# Patient Record
Sex: FEMALE | Race: WHITE
[De-identification: names, ages, dates, MRNs, and addresses within clinical notes are randomized per-mention and may not be internally consistent; named-entity substitution may affect disease eponyms.]

---

## 2021-06-02 ENCOUNTER — HOSPITAL ENCOUNTER (OUTPATIENT)
Dept: HOSPITAL 76 - DI | Age: 26
Discharge: HOME | End: 2021-06-02
Attending: OBSTETRICS & GYNECOLOGY
Payer: COMMERCIAL

## 2021-06-02 DIAGNOSIS — Z32.01: ICD-10-CM

## 2021-06-02 DIAGNOSIS — Z36.89: Primary | ICD-10-CM

## 2021-06-02 LAB
BASOPHILS NFR BLD AUTO: 0.1 10^3/UL (ref 0–0.1)
BASOPHILS NFR BLD AUTO: 0.5 %
EOSINOPHIL # BLD AUTO: 0.2 10^3/UL (ref 0–0.7)
EOSINOPHIL NFR BLD AUTO: 2.2 %
ERYTHROCYTE [DISTWIDTH] IN BLOOD BY AUTOMATED COUNT: 12.6 % (ref 12–15)
HCT VFR BLD AUTO: 39.7 % (ref 37–47)
HGB UR QL STRIP: 12.9 G/DL (ref 12–16)
LYMPHOCYTES # SPEC AUTO: 1.7 10^3/UL (ref 1.5–3.5)
LYMPHOCYTES NFR BLD AUTO: 17.6 %
MCH RBC QN AUTO: 28.7 PG (ref 27–31)
MCHC RBC AUTO-ENTMCNC: 32.5 G/DL (ref 32–36)
MCV RBC AUTO: 88.4 FL (ref 81–99)
MONOCYTES # BLD AUTO: 0.5 10^3/UL (ref 0–1)
MONOCYTES NFR BLD AUTO: 5 %
NEUTROPHILS # BLD AUTO: 7.2 10^3/UL (ref 1.5–6.6)
NEUTROPHILS # SNV AUTO: 9.7 X10^3/UL (ref 4.8–10.8)
NEUTROPHILS NFR BLD AUTO: 74.5 %
NRBC # BLD AUTO: 0 /100WBC
NRBC # BLD AUTO: 0 X10^3/UL
PDW BLD AUTO: 9.8 FL (ref 7.9–10.8)
PLATELET # BLD: 289 10^3/UL (ref 130–450)
RBC MAR: 4.49 10^6/UL (ref 4.2–5.4)

## 2021-06-02 PROCEDURE — 85025 COMPLETE CBC W/AUTO DIFF WBC: CPT

## 2021-06-02 PROCEDURE — 86762 RUBELLA ANTIBODY: CPT

## 2021-06-02 PROCEDURE — 86850 RBC ANTIBODY SCREEN: CPT

## 2021-06-02 PROCEDURE — 87340 HEPATITIS B SURFACE AG IA: CPT

## 2021-06-02 PROCEDURE — 86901 BLOOD TYPING SEROLOGIC RH(D): CPT

## 2021-06-02 PROCEDURE — 36415 COLL VENOUS BLD VENIPUNCTURE: CPT

## 2021-06-02 PROCEDURE — 86900 BLOOD TYPING SEROLOGIC ABO: CPT

## 2021-06-02 PROCEDURE — 86803 HEPATITIS C AB TEST: CPT

## 2021-06-02 PROCEDURE — 87389 HIV-1 AG W/HIV-1&-2 AB AG IA: CPT

## 2021-06-02 PROCEDURE — 86787 VARICELLA-ZOSTER ANTIBODY: CPT

## 2021-06-02 PROCEDURE — 86592 SYPHILIS TEST NON-TREP QUAL: CPT

## 2021-06-03 ENCOUNTER — HOSPITAL ENCOUNTER (OUTPATIENT)
Dept: HOSPITAL 76 - LAB.WC | Age: 26
Discharge: HOME | End: 2021-06-03
Attending: OBSTETRICS & GYNECOLOGY
Payer: COMMERCIAL

## 2021-06-03 DIAGNOSIS — Z36.89: Primary | ICD-10-CM

## 2021-06-03 PROCEDURE — 80306 DRUG TEST PRSMV INSTRMNT: CPT

## 2021-06-03 PROCEDURE — 87591 N.GONORRHOEAE DNA AMP PROB: CPT

## 2021-06-03 PROCEDURE — 87661 TRICHOMONAS VAGINALIS AMPLIF: CPT

## 2021-06-03 PROCEDURE — 87491 CHLMYD TRACH DNA AMP PROBE: CPT

## 2021-06-03 NOTE — ULTRASOUND REPORT
PROCEDURE:  OB First Trimester w/TV

 

INDICATIONS:  POSTIVIE PREGNANCY TEST

 

OUTSIDE/PRIOR DATING DATA:  

Last menstrual period (LMP): 3/19/2021.  

LMP-based estimated date of delivery (MALCOLM): 12/24/2021.  

First dating scan (date and location): 6/2/2021.  

Estimated date of delivery (MALCOLM) from first dating scan: 1/11/2022.  

The below data below was generated using the ultrasound MALCOLM of 1/11/2022

 

TECHNIQUE:  

Real-time scanning was performed of the fetus and maternal pelvic organs, with image documentation.  
Endovaginal scanning was also performed to better visualize the fetus and maternal ovaries.  

 

COMPARISON:  None

 

FINDINGS:  

 

Embryo:  Single living intrauterine pregnancy identified. Fetal pole identified. Crown-rump length me
asures 1.7 cm corresponding to ultrasound estimated gestational age of 8 weeks 1 day. Fetal heart rat
e measured at 160 bpm. Until note made of a small 1.1 x 0.4 cm subchorionic/periimplantational bleed.


 

Measurement variability in dating:  +/- 4 weeks by LMP, +/- 7 days by mean sac diameter (use before 6
 weeks gestation if crown-rump length not able to be measured), +/- 5 days by crown-rump length (6-12
 weeks gestation).  

 

Maternal organs: Small right ovarian corpus luteal cyst. Left ovary is sonographically normal.

 

 

IMPRESSION:  

 

Single living intrauterine pregnancy with ultrasound estimated gestational age of 8 weeks 1 day corre
sponding to ultrasound MALCOLM of 1/11/2022.

 

Reviewed by: Taty Logan MD, PhD on 6/3/2021 10:49 AM PDT

Approved by: Taty Logan MD, PhD on 6/3/2021 10:49 AM PDT

 

 

Station ID:  [id number]

## 2021-06-04 LAB
AMPHET UR QL SCN: NEGATIVE
BARBITURATES UR QL SCN>300 NG/ML: NEGATIVE
BENZODIAZ UR QL SCN: NEGATIVE
C TRACH DNA SPEC NAA+PROBE-ACNC: NEGATIVE
COCAINE UR-SCNC: NEGATIVE UMOL/L
HEPATITIS B SURFACE ANTIGEN: (no result)
HEPATITIS C ANTIBODY: (no result)
HIV AG/AB 4TH GEN: (no result)
METHADONE UR QL SCN: NEGATIVE
METHAMPHET UR QL SCN: NEGATIVE
N GONORRHOEA DNA GENITAL QL NAA+PROBE: NEGATIVE
OPIATES UR QL SCN: NEGATIVE
SIGNAL TO CUT-OFF: 0 (ref ?–1)
T VAGINALIS RRNA GENITAL QL PROBE: NEGATIVE
THC UR QL SCN: NEGATIVE
VOLATILE DRUGS POS SERPL SCN: (no result)

## 2021-08-24 ENCOUNTER — HOSPITAL ENCOUNTER (OUTPATIENT)
Dept: HOSPITAL 76 - DI | Age: 26
Discharge: HOME | End: 2021-08-24
Attending: OBSTETRICS & GYNECOLOGY
Payer: COMMERCIAL

## 2021-08-24 DIAGNOSIS — Z3A.20: ICD-10-CM

## 2021-08-24 DIAGNOSIS — O09.92: Primary | ICD-10-CM

## 2021-08-25 NOTE — ULTRASOUND REPORT
PROCEDURE:  OB Detailed Fetal Eval

 

INDICATIONS:  SUPERV HIGH RISK PREG, FAS

 

OUTSIDE/PRIOR DATING DATA:  

Last menstrual period (LMP): 3/19/2021.  

LMP-based estimated date of delivery (MALCOLM): 12/24/2021.  

First dating scan (date and location): 6/2/2021.  

Estimated date of delivery (MALCOLM) from first dating scan: 1/11/2022.  

The below data below was generated using the ultrasound MALCOLM of 1/11/2022

 

TECHNIQUE: 

Real-time scanning was performed of the fetus, with image documentation and biometric measurements.  


Endovaginal scanning:  Not performed.

 

COMPARISON:  OB ultrasound 6/2/2021

 

FINDINGS:  

 

General:  A single living intrauterine gestation is present.  

Presentation:  Variable

Placenta:  Placental position is fundal, without previa.  

Amniotic fluid index:  14.6 cm,  normal for gestational age.  Largest pocket 4.1 cm.

Fetal heart rate:  150 beats per minute.  

Maternal cervical canal:  3.9 cm long; normal length is 2.5 cm or more. 

 

Fetal biometrics:  

Biparietal diameter:  5.2 cm, 21 weeks 5 days

Head circumference:  19.0 cm, 21 weeks 2 days

Abdominal circumference:  16.6 cm, 21 weeks 5 days

Femur length:  3.4 cm, 20 weeks 5 days

Estimated gestational age from initial scan: 20 weeks 0 days  

Composite gestational age from present scan:  20 weeks 6 days

Estimated fetal weight and percentile:  411 g, 97th percentile.

Measurement variability in biometric dating: +/- 10 days from 12-20 weeks gestation, +/- 2 weeks from
 20-30 weeks gestation, +/- 3 weeks at 30 weeks gestation or later.  

 

Anatomic survey:  

Neuro:  Ventricles are normal at less than 10 mm.  Cisterna magna is normal at 3-11 mm.  Cerebellum i
s normal in size and morphology.  

Nuchal skin fold:  Normal at less than 6 mm between 14 and 20 weeks gestational age.  

Face:  Nose and lips, facial profile are normal.  

Spine:  No evidence for spina bifida.  

Heart:  4-chambered heart is present, with normal ventricular outflow tracts.  

Diaphragm:  Diaphragm is intact.  

Stomach:  Left-sided stomach is present.  

Kidneys:  No fetal hydronephrosis.  Normal is less than 5 mm in 2nd trimester, less than 7 mm in 3rd 
trimester.  

Cord:  3 vessel cord has orthotopic insertion.  

Bladder:  Normal in size.  

Extremities:  All 4 extremities are visualized.  

 

IMPRESSION:  

1.  Single live intrauterine pregnancy.

2.  Estimated fetal weight 411 g, at the 97th percentile for gestational age. Recommend correlation a
nd follow-up to exclude macrosomia.

3.  Normal fetal anatomic survey.

4.  Normal amniotic fluid index.

 

Reviewed by: Donovan Whalen MD on 8/25/2021 12:03 PM PDT

Approved by: Donovan Whalen MD on 8/25/2021 12:03 PM PDT

 

 

Station ID:  [id number]

## 2021-09-02 ENCOUNTER — HOSPITAL ENCOUNTER (OUTPATIENT)
Dept: HOSPITAL 76 - LAB | Age: 26
Discharge: HOME | End: 2021-09-02
Attending: OBSTETRICS & GYNECOLOGY
Payer: COMMERCIAL

## 2021-09-02 DIAGNOSIS — O09.90: Primary | ICD-10-CM

## 2021-09-02 PROCEDURE — 36415 COLL VENOUS BLD VENIPUNCTURE: CPT

## 2021-09-02 PROCEDURE — 82950 GLUCOSE TEST: CPT

## 2021-11-08 ENCOUNTER — HOSPITAL ENCOUNTER (OUTPATIENT)
Dept: HOSPITAL 76 - LAB | Age: 26
Discharge: HOME | End: 2021-11-08
Attending: OBSTETRICS & GYNECOLOGY
Payer: COMMERCIAL

## 2021-11-08 DIAGNOSIS — O09.90: Primary | ICD-10-CM

## 2021-11-08 PROCEDURE — 36415 COLL VENOUS BLD VENIPUNCTURE: CPT

## 2021-11-08 PROCEDURE — 82950 GLUCOSE TEST: CPT

## 2021-12-01 ENCOUNTER — HOSPITAL ENCOUNTER (OUTPATIENT)
Dept: HOSPITAL 76 - WFO | Age: 26
Discharge: HOME | End: 2021-12-01
Attending: STUDENT IN AN ORGANIZED HEALTH CARE EDUCATION/TRAINING PROGRAM
Payer: COMMERCIAL

## 2021-12-01 ENCOUNTER — HOSPITAL ENCOUNTER (OUTPATIENT)
Dept: HOSPITAL 76 - LAB.WC | Age: 26
Discharge: HOME | End: 2021-12-01
Attending: OBSTETRICS & GYNECOLOGY
Payer: COMMERCIAL

## 2021-12-01 VITALS — DIASTOLIC BLOOD PRESSURE: 79 MMHG | SYSTOLIC BLOOD PRESSURE: 159 MMHG

## 2021-12-01 DIAGNOSIS — O13.3: Primary | ICD-10-CM

## 2021-12-01 DIAGNOSIS — Z3A.34: ICD-10-CM

## 2021-12-01 DIAGNOSIS — Z36.85: Primary | ICD-10-CM

## 2021-12-01 LAB
ALBUMIN DIAFP-MCNC: 2.7 G/DL (ref 3.2–5.5)
ALBUMIN/GLOB SERPL: 0.7 {RATIO} (ref 1–2.2)
ALP SERPL-CCNC: 140 IU/L (ref 42–121)
ALT SERPL W P-5'-P-CCNC: 16 IU/L (ref 10–60)
ANION GAP SERPL CALCULATED.4IONS-SCNC: 9 MMOL/L (ref 6–13)
AST SERPL W P-5'-P-CCNC: 15 IU/L (ref 10–42)
BASOPHILS NFR BLD AUTO: 0 10^3/UL (ref 0–0.1)
BASOPHILS NFR BLD AUTO: 0.4 %
BILIRUB BLD-MCNC: 0.4 MG/DL (ref 0.2–1)
BUN SERPL-MCNC: 7 MG/DL (ref 6–20)
CALCIUM UR-MCNC: 9.1 MG/DL (ref 8.5–10.3)
CHLORIDE SERPL-SCNC: 106 MMOL/L (ref 101–111)
CO2 SERPL-SCNC: 20 MMOL/L (ref 21–32)
CREAT SERPLBLD-SCNC: 0.5 MG/DL (ref 0.4–1)
CREAT UR-SCNC: 243.7 MG/DL
EOSINOPHIL # BLD AUTO: 0.3 10^3/UL (ref 0–0.7)
EOSINOPHIL NFR BLD AUTO: 2.9 %
ERYTHROCYTE [DISTWIDTH] IN BLOOD BY AUTOMATED COUNT: 13.7 % (ref 12–15)
GFRSERPLBLD MDRD-ARVRAT: 147 ML/MIN/{1.73_M2} (ref 89–?)
GLOBULIN SER-MCNC: 3.9 G/DL (ref 2.1–4.2)
GLUCOSE SERPL-MCNC: 82 MG/DL (ref 70–100)
HCT VFR BLD AUTO: 34.1 % (ref 37–47)
HGB UR QL STRIP: 11.2 G/DL (ref 12–16)
LYMPHOCYTES # SPEC AUTO: 1.4 10^3/UL (ref 1.5–3.5)
LYMPHOCYTES NFR BLD AUTO: 15.4 %
MCH RBC QN AUTO: 26.3 PG (ref 27–31)
MCHC RBC AUTO-ENTMCNC: 32.8 G/DL (ref 32–36)
MCV RBC AUTO: 80 FL (ref 81–99)
MONOCYTES # BLD AUTO: 0.9 10^3/UL (ref 0–1)
MONOCYTES NFR BLD AUTO: 10.1 %
NEUTROPHILS # BLD AUTO: 6.3 10^3/UL (ref 1.5–6.6)
NEUTROPHILS # SNV AUTO: 9.1 X10^3/UL (ref 4.8–10.8)
NEUTROPHILS NFR BLD AUTO: 70 %
NRBC # BLD AUTO: 0 /100WBC
NRBC # BLD AUTO: 0 X10^3/UL
PDW BLD AUTO: 10.5 FL (ref 7.9–10.8)
PLATELET # BLD: 271 10^3/UL (ref 130–450)
POTASSIUM SERPL-SCNC: 4.2 MMOL/L (ref 3.5–5)
PROT 24H UR-MCNC: 55 MG/DL
PROT SPEC-MCNC: 6.6 G/DL (ref 6.7–8.2)
RBC MAR: 4.26 10^6/UL (ref 4.2–5.4)
SODIUM SERPLBLD-SCNC: 135 MMOL/L (ref 135–145)

## 2021-12-01 PROCEDURE — 99215 OFFICE O/P EST HI 40 MIN: CPT

## 2021-12-01 PROCEDURE — 80053 COMPREHEN METABOLIC PANEL: CPT

## 2021-12-01 PROCEDURE — 36415 COLL VENOUS BLD VENIPUNCTURE: CPT

## 2021-12-01 PROCEDURE — 85025 COMPLETE CBC W/AUTO DIFF WBC: CPT

## 2021-12-01 PROCEDURE — 59025 FETAL NON-STRESS TEST: CPT

## 2021-12-01 PROCEDURE — 87797 DETECT AGENT NOS DNA DIR: CPT

## 2021-12-01 PROCEDURE — 82570 ASSAY OF URINE CREATININE: CPT

## 2021-12-01 PROCEDURE — 84156 ASSAY OF PROTEIN URINE: CPT

## 2021-12-01 NOTE — PROVIDER PROGRESS NOTE
- HPI


Chief Complaint: Hypertension/PIH


Current Pregnancy: 


Vital Signs











Blood Pressure  154/69 H  21 14:10




















Temperature  98.6 F   21 14:18


 


Heart Rate      


 


Respiratory Rate      


 


Blood Pressure  159/79 H  21 15:10


 


O2 Saturation      














- Procedures


OB Procedure Performed: NST


Diagnosis/Indication for NST: Gestational Hypertension


NST Procedure: 


 





FHT: 130 beats per baseline, moderate variability, accelerations present, no 

decelerations.


[place]: Irregular, not strong enough to bother patient.


Service Date of procedure: 21 (Read 21)


Procedure Details: 





NST Procedure





Start Date                       21


Start Time                       14:10


Stop Time                        15:55


Vibroacoustic Stimulation Used   No


Patient States Fetal Movement    Yes








Findings: 


Patient is a 26-year-old G1, P0 at 34 weeks 1 day gestation-year-old presenting 

to triage for gestational hypertension.  She was sent from clinic today for 

elevated blood pressures.


She has good fetal movement, no leaking, no vaginal bleeding.  She denies 

headache, right upper quadrant pain, changes in vision.





Past medical history


Anxiety


Obesity





Past surgical history 


Wrist surgery





Family history


Mother: Obesity


Father: Obesity, diabetes, hypertension


Maternal grandfather: Lung cancer





Social history


Denies tobacco, alcohol, drugs





Physical 


                                        











Temp Pulse Resp BP Pulse Ox


 


 98.6 F         159/79 H   


 


 21 14:18        21 15:10   











Constitutional: alert, no acute distress, well hydrated, well developed, well no

urished, appropriate dress.


Skin: normal turgor, normal color.


Head: atraumatic, normocephalic.


Cardiovascular: RRR.


Respiratory: no respiratory distress.


Abdomen: Gravid nondistended, nontender.


Neurologic: normal, sensation intact, motor intact.


Psych: affect and mood appropriate, normal interaction, good eye contact.





Assessment and plan


26-year-old  at 34 weeks 1 day gestation with gestational hypertension





1.  Gestational hypertension:


-No signs or symptoms of preeclampsia


-Platelets, AST/ALT, protein creatinine ratio were all normal.


-Patient did have elevated blood pressure, but these were done with a cuff to 

small along her forearm.  Repeated with an adequate cuff sized on her upper arm 

and were elevated, but not severe range.


-Patient has follow-up in clinic for blood pressure check as well as subsequent 

prenatal check.


-Preeclampsia warning signs and labor precautions given.


-Discussed induction of labor at 37 weeks or sooner if symptoms worsen.

## 2021-12-10 ENCOUNTER — HOSPITAL ENCOUNTER (OUTPATIENT)
Dept: HOSPITAL 76 - DI | Age: 26
Discharge: HOME | End: 2021-12-10
Attending: OBSTETRICS & GYNECOLOGY
Payer: COMMERCIAL

## 2021-12-10 DIAGNOSIS — O09.93: Primary | ICD-10-CM

## 2021-12-10 DIAGNOSIS — O36.63X1: ICD-10-CM

## 2021-12-10 DIAGNOSIS — Z3A.37: ICD-10-CM

## 2021-12-13 NOTE — ULTRASOUND REPORT
PROCEDURE:  OB F/U or Repeat

 

INDICATIONS:  SUPERV OF HIGH RISK PREG, EXCESSIVE FETAL GROWTH

 

OUTSIDE/PRIOR DATING DATA:  

Last menstrual period (LMP): 3/19/2021.  

LMP-based estimated date of delivery (MALCOLM): 12/24/2021.  

First dating scan (date and location): 6/2/2021.  

Estimated date of delivery (MALCOLM) from first dating scan: 1/11/2022.  

The below data below was generated using the ultrasound derived MALCOLM of 1/11/2022

 

TECHNIQUE: 

Real-time scanning was performed of the fetus, with image documentation and biometric measurements.  


 

COMPARISON:  8/24/2021, 6/2/2021.

 

FINDINGS:  

 

General:  A single living intrauterine gestation is present.  

Presentation:  Vertex

Placenta:  Placental position is fundal, without previa.  

Amniotic fluid index:  11.1 cm, within normal limits for gestational age. Largest pocket measures 4.6
 cm. 

Fetal heart rate:  121 beats per minute.  

Maternal cervical canal:  Not well seen. 

 

Fetal biometrics:  

Biparietal diameter:  9.2 cm, 37 weeks 3 days

Head circumference:  33.7 cm, 38 weeks 5 days

Abdominal circumference:  35.2 cm, 39 weeks 1 day

Femur length:  7.0 cm, 35 weeks 6 days

Estimated gestational age from initial scan: 35 weeks 3 days

Composite gestational age from present scan:  37 weeks 6 days

Estimated fetal weight and percentile: 3408 g corresponding to the 98th percentile

Measurement variability in biometric dating: +/- 10 days from 12-20 weeks gestation, +/- 2 weeks from
 20-30 weeks gestation, +/- 3 weeks at 30 weeks gestation or more.  

 

IMPRESSION:  

 

1. Single living intrauterine pregnancy demonstrating interval growth with estimated fetal weight at 
the 98th percentile, again suggestive of developing macrosomia.

 

2. Maternal cervical canal not well seen.

 

3. Amniotic fluid index within normal limits.

 

Reviewed by: Triston Cueto MD on 12/13/2021 10:47 AM PST

Approved by: Triston Cueto MD on 12/13/2021 10:47 AM PST

 

 

Station ID:  [id number]

## 2021-12-15 ENCOUNTER — HOSPITAL ENCOUNTER (OUTPATIENT)
Dept: HOSPITAL 76 - WFO | Age: 26
Discharge: HOME | End: 2021-12-15
Attending: OBSTETRICS & GYNECOLOGY
Payer: COMMERCIAL

## 2021-12-15 VITALS — SYSTOLIC BLOOD PRESSURE: 120 MMHG | DIASTOLIC BLOOD PRESSURE: 73 MMHG

## 2021-12-15 DIAGNOSIS — O13.3: Primary | ICD-10-CM

## 2021-12-15 DIAGNOSIS — Z3A.36: ICD-10-CM

## 2021-12-15 PROCEDURE — 59025 FETAL NON-STRESS TEST: CPT

## 2021-12-15 NOTE — ULTRASOUND REPORT
PROCEDURE:  OB Limited

 

INDICATIONS:  gest. HTN

 

OUTSIDE/PRIOR DATING DATA:  

Last menstrual period (LMP): 3/19/2021.  

LMP-based estimated date of delivery (MALCOLM): 12/24/2021.  

First dating scan (date and location): 6/2/2021.  

Estimated date of delivery (MALCOLM) from first dating scan: 1/11/2022.  

The below data below was generated using the ultrasound MALCOLM of 1/11/2022

 

TECHNIQUE: 

Real-time scanning was performed of the fetus, with image documentation.  

Endovaginal scanning:  Not performed.

 

COMPARISON:  OB ultrasound 12/10/2021.

 

FINDINGS:  

A single living intrauterine gestation is present.  

Presentation: Vertex

Placenta: Placental position is anterior fundal, without previa.  

Amniotic fluid index: 23.7 cm, normal for gestational age.  Largest pocket 7 cm.

Fetal heart rate: 140 beats per minutes.  

Maternal cervical canal: Not well seen. 

 

Estimated gestational age from initial scan: 36 weeks 1 day.  

 

IMPRESSION:  

1. Walker living intrauterine pregnancy at 36 weeks 1 day based on prior ultrasound. Vertex positi
on.

 

2. Normal placenta. GOLD 23.7 cm is within normal limits.

 

Reviewed by: Julio Hernandez MD on 12/15/2021 12:50 PM PST

Approved by: Julio Hernandez MD on 12/15/2021 12:50 PM PST

 

 

Station ID:  [id number]

## 2021-12-15 NOTE — PROCEDURE REPORT
- HPI


Diagnosis/Indication for NST: Gestational Hypertension


                                                               Current Pregnancy





EDU                              22


Gestation                        36 Weeks and 1 Days


                          1


Para                             0





Vital Signs











Temperature  98.6 F   12/15/21 10:41


 


Heart Rate  98   12/15/21 10:41


 


Respiratory Rate  18   12/15/21 10:41


 


Blood Pressure  120/73   12/15/21 10:41




















Temperature  98.6 F   12/15/21 10:41


 


Heart Rate  98   12/15/21 10:41


 


Respiratory Rate  18   12/15/21 10:41


 


Blood Pressure  120/73   12/15/21 10:41


 


O2 Saturation      














- NST Procedure


NST Procedure





Start Date                       12/15/21


Start Time                       10:45


Stop Time                        11:30


Vibroacoustic Stimulation Used   No


Patient States Fetal Movement    Yes











- Results and Plan


Findings/Impression: 


Fetal heart rate baseline-140beats per minutes


Moderate variability


Accelerations 15x15


Decelerations none


Contractions rare


NST reactive and reassuring





GOLD 23 ultrasound.,  High normal





Plan: 


Continue twice-weekly NST/ BPP

## 2021-12-20 ENCOUNTER — HOSPITAL ENCOUNTER (OUTPATIENT)
Dept: HOSPITAL 76 - WFO | Age: 26
Discharge: HOME | End: 2021-12-20
Attending: OBSTETRICS & GYNECOLOGY
Payer: COMMERCIAL

## 2021-12-20 ENCOUNTER — HOSPITAL ENCOUNTER (INPATIENT)
Dept: HOSPITAL 76 - WFO | Age: 26
LOS: 4 days | Discharge: HOME | End: 2021-12-24
Attending: STUDENT IN AN ORGANIZED HEALTH CARE EDUCATION/TRAINING PROGRAM | Admitting: OBSTETRICS & GYNECOLOGY
Payer: COMMERCIAL

## 2021-12-20 VITALS — SYSTOLIC BLOOD PRESSURE: 136 MMHG | DIASTOLIC BLOOD PRESSURE: 81 MMHG

## 2021-12-20 DIAGNOSIS — L29.9: ICD-10-CM

## 2021-12-20 DIAGNOSIS — F41.9: ICD-10-CM

## 2021-12-20 DIAGNOSIS — O13.3: Primary | ICD-10-CM

## 2021-12-20 DIAGNOSIS — D62: ICD-10-CM

## 2021-12-20 DIAGNOSIS — Z3A.36: ICD-10-CM

## 2021-12-20 DIAGNOSIS — Z3A.37: ICD-10-CM

## 2021-12-20 DIAGNOSIS — O36.63X0: ICD-10-CM

## 2021-12-20 DIAGNOSIS — O26.893: ICD-10-CM

## 2021-12-20 LAB
BASOPHILS NFR BLD AUTO: 0.1 10^3/UL (ref 0–0.1)
BASOPHILS NFR BLD AUTO: 0.5 %
EOSINOPHIL # BLD AUTO: 0.3 10^3/UL (ref 0–0.7)
EOSINOPHIL NFR BLD AUTO: 2.8 %
ERYTHROCYTE [DISTWIDTH] IN BLOOD BY AUTOMATED COUNT: 14.2 % (ref 12–15)
HCT VFR BLD AUTO: 31.8 % (ref 37–47)
HGB UR QL STRIP: 10.3 G/DL (ref 12–16)
LYMPHOCYTES # SPEC AUTO: 1.5 10^3/UL (ref 1.5–3.5)
LYMPHOCYTES NFR BLD AUTO: 16.6 %
MCH RBC QN AUTO: 25.7 PG (ref 27–31)
MCHC RBC AUTO-ENTMCNC: 32.4 G/DL (ref 32–36)
MCV RBC AUTO: 79.3 FL (ref 81–99)
MONOCYTES # BLD AUTO: 0.9 10^3/UL (ref 0–1)
MONOCYTES NFR BLD AUTO: 9.5 %
NEUTROPHILS # BLD AUTO: 6.4 10^3/UL (ref 1.5–6.6)
NEUTROPHILS # SNV AUTO: 9.2 X10^3/UL (ref 4.8–10.8)
NEUTROPHILS NFR BLD AUTO: 69.9 %
NRBC # BLD AUTO: 0 /100WBC
NRBC # BLD AUTO: 0 X10^3/UL
PDW BLD AUTO: 11.5 FL (ref 7.9–10.8)
PLATELET # BLD: 267 10^3/UL (ref 130–450)
RBC MAR: 4.01 10^6/UL (ref 4.2–5.4)

## 2021-12-20 PROCEDURE — 99215 OFFICE O/P EST HI 40 MIN: CPT

## 2021-12-20 PROCEDURE — 86850 RBC ANTIBODY SCREEN: CPT

## 2021-12-20 PROCEDURE — 59025 FETAL NON-STRESS TEST: CPT

## 2021-12-20 PROCEDURE — 76815 OB US LIMITED FETUS(S): CPT

## 2021-12-20 PROCEDURE — 86920 COMPATIBILITY TEST SPIN: CPT

## 2021-12-20 PROCEDURE — 36415 COLL VENOUS BLD VENIPUNCTURE: CPT

## 2021-12-20 PROCEDURE — 85025 COMPLETE CBC W/AUTO DIFF WBC: CPT

## 2021-12-20 PROCEDURE — 86900 BLOOD TYPING SEROLOGIC ABO: CPT

## 2021-12-20 PROCEDURE — 80053 COMPREHEN METABOLIC PANEL: CPT

## 2021-12-20 PROCEDURE — 86901 BLOOD TYPING SEROLOGIC RH(D): CPT

## 2021-12-20 RX ADMIN — SODIUM CHLORIDE, PRESERVATIVE FREE SCH ML: 5 INJECTION INTRAVENOUS at 22:40

## 2021-12-20 NOTE — PROCEDURE REPORT
- HPI


Diagnosis/Indication for NST: Gestational Hypertension


                                                               Current Pregnancy





EDU                              22


Gestation                        36 Weeks and 6 Days


                          1


Para                             0





Vital Signs











Temperature  97.8 F   21 13:13


 


Heart Rate  99   21 13:13


 


Respiratory Rate  18   21 13:13


 


Blood Pressure  136/81 H  21 13:13




















Temperature  97.9 F   21 13:16


 


Heart Rate  99   21 13:16


 


Respiratory Rate  18   21 13:16


 


Blood Pressure  136/81 H  21 13:16


 


O2 Saturation  100   21 13:16














- NST Procedure


NST Procedure





Start Date                       21


Start Time                       13:12


Stop Time                        13:33


Vibroacoustic Stimulation Used   No


Patient States Fetal Movement    Yes








 mod ozzy 15x15 accels no decels


TOCO: quiet








- Results and Plan


Plan: 





Patient is a 25 yo  at 36+6 with pregnancy affected by GHTN and suspected 

fetal macrosomia





Cat I tracing





GOLD 20





Plan for IOL tomorrow at 37 weeks





DOS: 21


NST read: 21





DX: IUP at 36+6 wga


      Gestational HTN


      Suspected fetal macrosomia

## 2021-12-20 NOTE — ULTRASOUND REPORT
PROCEDURE:  OB Limited

 

INDICATIONS:  Gestational hypertension; ; EDC 2022

 

OUTSIDE/PRIOR DATING DATA:  

Last menstrual period (LMP): 3/19/2021.  

LMP-based estimated date of delivery (MALCOLM): 2021.  

First dating scan (date and location): 2021.  

Estimated date of delivery (MALCOLM) from first dating scan: 2022.  

The below data below was generated using the ultrasound MALCOLM of 2022

 

TECHNIQUE: 

Real-time scanning was performed of the fetus, with image documentation.  

Endovaginal scanning:  Not performed

 

COMPARISON:  12/15/2021

 

FINDINGS:  

A single living intrauterine gestation is present.  

Presentation: Vertex

Placenta: Placental position is fundal, without previa.  

Amniotic fluid index: 20.6 cm, within normal limits for gestational age. (Previously 23.7 cm). Larges
t pocket 9.1 cm. 

Fetal heart rate: 145 beats per minutes.  

Maternal cervical canal: 3.3 cm long; normal length is 2.5 cm or more.  Not well seen.

 

Estimated gestational age from initial scan: 36 weeks 6 days.  

 

IMPRESSION:  

1. Walker living intrauterine pregnancy at 36 weeks 6 days based on prior ultrasound. Vertex posit
ion.

 

2. Normal placenta. GOLD within normal limits measuring 20.6 cm.

 

Reviewed by: Julio Hernandez MD on 2021 3:52 PM PST

Approved by: Julio Hernandez MD on 2021 3:52 PM PST

 

 

Station ID:  [id number]

## 2021-12-20 NOTE — HISTORY & PHYSICAL EXAMINATION
Prenatal Admit History





- Visit Reason


Visit Reason: Membranes rupture





- Pregnancy


: 1


Parity: 0


Prenatal Risk/History: positive: Pregnancy induced HTN, High risk





- Mother's Labs


Mother's Blood Type: positive: A


Mother's RH: positive: Positive


GBS: positive: Group B Step Negative


Rubella Status: positive: Immune





- Other Maternal History


Other Maternal History: 


ID: Patient is a 25 yo  at 36+6 wga here with ruptured membranes





HPI: Pregnancy has been complicated by GHTN with plan for IOL on 21. 


Patient reports large gush of fluid this evening as she was planning for a bath.

Continues to have high volume leakage. Not feeling contractions but has 

contraction on monitor. No VB. Endorses fetal movement. 


Mild range pressures during late pregnancy. No PIH symptoms and negative PIH 

labs. 


Has been having severe pruritus in late pregnancy. BA ordered but not completed.

Continues to have severe itching.


Measures size greater than dates. EFW 97%ile with  3408g estimated at 34-35 

weeks. 








PNC: 


LMP 3/19/21 gives MALCOLM of 2021


US on 21 at 8w1d gives MALCOLM 22


Final MALCOLM 22





BMI>40: 


-ASA 81 mg at 12 week


-early glucola- 121


- Suspected fetal macrosomia


-S>D; US at 35 weeks shows EFW 98%ile, 3408 g





Pruritus today: ordering BA  anf LFTs





A pos/Rub imm


GENETICS: Desires [place] with AFP- declined by ; declines other testing


-Also desires CF carrier screening- ordered. Discussed in FU. Did not complete. 


FAS:  EFW 97%ile, 3VC, fundal, CL 3.9 FAS wnl





TDAP 10/21


Influenza declined 


Glucola: 121- early glcuola


Repeat Glucola  results 133


HSV Denies


GBS: 2021 NEGATIVE


Breast pump: 10/20


PAP:  Patient prefers to wait until 6 week pp visit





 


Past Medical History:


   Reviewed and updated today:


      Anxiety Disorder


      BMI > 40





Past Surgical History:


   wrist ()





Family History Summary:


 Weight Disorder for Mother 


 Diabetes for Father


 Hypertension for Father 


 Weight Disorder for Father 


 Lung Cancer for Aunt


 Lung Cancer for Maternal Grandfather 








Social History Summary:


Patient has never smoked.


Patient has never used smokeless tobacco.


Passive Smoke: N


Alcohol Use: N


Drug Use: N


HIV/High Risk: N


Regular Exercise: Y


Hx Domestic Abuse: N


Rastafarian Affecting Care: N


Sexually Active: Y





Lives in [place] with  and [place] with DS


 at [employer]


FOB active duty


T: none


E: not in pregnancy


D: none


Safe at home





ROS: As per HPI, otherwise remaining systems are negative





PE: 


VS:151/73  82


GEN: NAD


HEENT: NCAT


CV: RRR


RESP: CTAB, nl effort


ABD: gravid, S&NT/ND


EXT: WWP


SVE: 50/-2


Gross rupture


vertex by BSUS





EFM: 135 mod ozzy 15x15 accels no decels


TOCO: Q5-6 min





A/P:   Patient is a 25 yo  at 36+6 wga here with ruptured membranes





LABOR: Discussed augmentation with pitocin vs expectant management. 


-Patient would like to have an hour to see if labor progresses and then is open 

to pitocin


-Reviewed risks of choriomanionitis. Increases with increased frequency of SVE, 

will limit cervical exams


-Open to augmentation with pitocin as indicated





GHTN: Isolated severe range pressure with subsequent mild rnage


-No PIH symptoms


-PIH  labs pending


-Labetalol vs nifed for severe range pressures; orders submitted


-Magnesium infusion with onset of severe features





QUESTION OF CHOLESTASIS: Patient with ongoign pruritus


-Did not complete BA assessment


-LFTs pending at admit





FWB: Vertex, suspected LGA, GBS negative, Cat I tracing


-CEFM


-Remain cognizant of EFW in the setting of dystotic labor course. VAVD only with

careful consideration of clinical scenario





PAIN: Desires epidural. OK with placement on patient request 





In-patient care








Meds/Allgy





- Allergies


Allergies/Adverse Reactions: 


                                    Allergies











Allergy/AdvReac Type Severity Reaction Status Date / Time


 


shellfish derived Allergy Unknown Unknown Verified 21 21:38

## 2021-12-21 LAB
ALBUMIN DIAFP-MCNC: 2.5 G/DL (ref 3.2–5.5)
ALBUMIN/GLOB SERPL: 0.6 {RATIO} (ref 1–2.2)
ALP SERPL-CCNC: 194 IU/L (ref 42–121)
ALT SERPL W P-5'-P-CCNC: 28 IU/L (ref 10–60)
ANION GAP SERPL CALCULATED.4IONS-SCNC: 10 MMOL/L (ref 6–13)
AST SERPL W P-5'-P-CCNC: 22 IU/L (ref 10–42)
BILIRUB BLD-MCNC: 0.3 MG/DL (ref 0.2–1)
BUN SERPL-MCNC: 12 MG/DL (ref 6–20)
CALCIUM UR-MCNC: 8.6 MG/DL (ref 8.5–10.3)
CHLORIDE SERPL-SCNC: 101 MMOL/L (ref 101–111)
CO2 SERPL-SCNC: 18 MMOL/L (ref 21–32)
CREAT SERPLBLD-SCNC: 0.5 MG/DL (ref 0.4–1)
GFRSERPLBLD MDRD-ARVRAT: 147 ML/MIN/{1.73_M2} (ref 89–?)
GLOBULIN SER-MCNC: 4 G/DL (ref 2.1–4.2)
GLUCOSE SERPL-MCNC: 145 MG/DL (ref 70–100)
POTASSIUM SERPL-SCNC: 3.9 MMOL/L (ref 3.5–5)
PROT SPEC-MCNC: 6.5 G/DL (ref 6.7–8.2)
SODIUM SERPLBLD-SCNC: 129 MMOL/L (ref 135–145)

## 2021-12-21 PROCEDURE — 10H07YZ INSERTION OF OTHER DEVICE INTO PRODUCTS OF CONCEPTION, VIA NATURAL OR ARTIFICIAL OPENING: ICD-10-PCS | Performed by: STUDENT IN AN ORGANIZED HEALTH CARE EDUCATION/TRAINING PROGRAM

## 2021-12-21 RX ADMIN — SODIUM CHLORIDE, PRESERVATIVE FREE SCH ML: 5 INJECTION INTRAVENOUS at 09:37

## 2021-12-21 RX ADMIN — ONDANSETRON PRN MG: 2 INJECTION INTRAMUSCULAR; INTRAVENOUS at 21:48

## 2021-12-21 RX ADMIN — LABETALOL HCL SCH MG: 100 TABLET, FILM COATED ORAL at 14:32

## 2021-12-21 RX ADMIN — CALCIUM CARBONATE (ANTACID) CHEW TAB 500 MG PRN MG: 500 CHEW TAB at 21:50

## 2021-12-21 RX ADMIN — CALCIUM CARBONATE (ANTACID) CHEW TAB 500 MG PRN MG: 500 CHEW TAB at 11:46

## 2021-12-21 RX ADMIN — LABETALOL HCL SCH MG: 100 TABLET, FILM COATED ORAL at 01:26

## 2021-12-21 RX ADMIN — CALCIUM CARBONATE (ANTACID) CHEW TAB 500 MG STA MG: 500 CHEW TAB at 04:26

## 2021-12-21 RX ADMIN — SODIUM CHLORIDE, POTASSIUM CHLORIDE, SODIUM LACTATE AND CALCIUM CHLORIDE SCH MLS/HR: 600; 310; 30; 20 INJECTION, SOLUTION INTRAVENOUS at 08:34

## 2021-12-21 RX ADMIN — SODIUM CHLORIDE, POTASSIUM CHLORIDE, SODIUM LACTATE AND CALCIUM CHLORIDE SCH MLS/HR: 600; 310; 30; 20 INJECTION, SOLUTION INTRAVENOUS at 00:38

## 2021-12-21 RX ADMIN — LABETALOL HCL SCH MG: 100 TABLET, FILM COATED ORAL at 01:25

## 2021-12-21 RX ADMIN — CALCIUM CARBONATE (ANTACID) CHEW TAB 500 MG STA MG: 500 CHEW TAB at 04:27

## 2021-12-21 RX ADMIN — ONDANSETRON PRN MG: 2 INJECTION INTRAMUSCULAR; INTRAVENOUS at 09:37

## 2021-12-21 NOTE — PROVIDER PROGRESS NOTE
Labor Progress Note





- Uterine Monitoring


Uterine Monitoring Mode: positive: External toco


Contraction Frequency (min/apart): 2-4


Contraction Intensity: positive: Mild to moderate


Uterine Resting Tone: positive: Soft





- Fetal Monitoring


Fetal Monitor Mode: positive: External ultrasound


Fetal Heart Rate Baseline: 130


Fetal Heart Rate Variability: positive: Moderate (6-25 bmp)


Fetal Accelerations: positive: Present, 15x15


Fetal Decelerations: positive: None


Fetal Strip Review: positive: Category I





- Vaginal Exam


Dilation (in cm): 7


Effacement (%): 90


Cervical Position: Posterior





- Labor Progress Note


Labor Progress Note/Additional Text: 


Patient making slow change, and there was some discrepancy over the last check. 

From my last exam, she is changed approximately 2 cm over 5 hours which is 

slower than expected.  She did have oxytocin discontinued for 30 minutes and was

recently restarted.  Will allow her to continue augmentation with oxytocin.  No 

signs of chorioamnionitis at this time.  Ruptured for 23 hours.  If she presents

is febrile or develops fetal tachycardia, will likely proceed with  

section if remote from delivery.

## 2021-12-21 NOTE — PROVIDER PROGRESS NOTE
Subjective





- Prog Note Date


Prog Note Date: 12/21/21


Prog Note Time: 08:28





- Subjective


Subjective: 





Patient has gotten an epidural this am. Had one dose of fentanyl and nitrous 

oxide this am. Comfortbale at present. BPs are in mild range on labetalol 100 mg

po bid. 


Pitocin currently at 8 mU/min. Fluid remains clear. 











Objective





- Vital Signs/Intake & Output


Reviewed Vital Signs: Yes


Vital Signs: 


see Centricity- mild range pressures


Intake & Output: 


                                 Intake & Output











 12/18/21 12/19/21 12/20/21 12/21/21





 23:59 23:59 23:59 23:59


 


Intake Total    800


 


Output Total   125 350


 


Balance   -125 450














- Objective


General Appearance: positive: No acute distress


Respiratory: positive: No respiratory distress


Cardiovascular: positive: Other (RR)


Abdomen: positive: Non-tender (gravid, S&NT)


Skin: positive: Color nml


Extremities: positive: Non-tender


Neurologic/Psychiatric: positive: Oriented x3


Comments/Other: 





SVE 5/70/-1, soft





- Lab Results


Fish Bones: 


                                 12/20/21 22:40





                                 12/20/21 22:40


Other Labs: 


                               Lab Results x24hrs











  12/20/21 12/20/21 12/20/21 Range/Units





  22:40 22:40 22:40 


 


WBC    9.2  (4.8-10.8)  x10^3/uL


 


RBC    4.01 L  (4.20-5.40)  10^6/uL


 


Hgb    10.3 L  (12.0-16.0)  g/dL


 


Hct    31.8 L  (37.0-47.0)  %


 


MCV    79.3 L  (81.0-99.0)  fL


 


MCH    25.7 L  (27.0-31.0)  pg


 


MCHC    32.4  (32.0-36.0)  g/dL


 


RDW    14.2  (12.0-15.0)  %


 


Plt Count    267  (130-450)  10^3/uL


 


MPV    11.5 H  (7.9-10.8)  fL


 


Neut # (Auto)    6.4  (1.5-6.6)  10^3/uL


 


Lymph # (Auto)    1.5  (1.5-3.5)  10^3/uL


 


Mono # (Auto)    0.9  (0.0-1.0)  10^3/uL


 


Eos # (Auto)    0.3  (0.0-0.7)  10^3/uL


 


Baso # (Auto)    0.1  (0.0-0.1)  10^3/uL


 


Absolute Nucleated RBC    0.00  x10^3/uL


 


Nucleated RBC %    0.0  /100WBC


 


Sodium   129 L   (135-145)  mmol/L


 


Potassium   3.9   (3.5-5.0)  mmol/L


 


Chloride   101   (101-111)  mmol/L


 


Carbon Dioxide   18 L   (21-32)  mmol/L


 


Anion Gap   10.0   (6-13)  


 


BUN   12   (6-20)  mg/dL


 


Creatinine   0.5   (0.4-1.0)  mg/dL


 


Estimated GFR (MDRD)   147   (>89)  


 


Glucose   145 H   ()  mg/dL


 


Calcium   8.6   (8.5-10.3)  mg/dL


 


Total Bilirubin   0.3   (0.2-1.0)  mg/dL


 


AST   22   (10-42)  IU/L


 


ALT   28   (10-60)  IU/L


 


Alkaline Phosphatase   194 H   ()  IU/L


 


Total Protein   6.5 L   (6.7-8.2)  g/dL


 


Albumin   2.5 L   (3.2-5.5)  g/dL


 


Globulin   4.0   (2.1-4.2)  g/dL


 


Albumin/Globulin Ratio   0.6 L   (1.0-2.2)  


 


Blood Type  A POSITIVE    


 


Antibody Screen  NEGATIVE    


 


Crossmatch IS Only  See Detail    














Assessment/Plan





- Problem List


(1) Labor abnormality


Impression: 


SROM: 


-on pitocin 8 mU/min


-Cat I tracing


-slow change





GHTN: mild pressures on labetalol 100 mg po bid


-Normal PIH  labs





Continue with pitocin augmentation

## 2021-12-21 NOTE — ANESTHESIA
Pre-Anesthesia VS, & Labs





- Diagnosis





active labor





- Procedure





labor epidural


Vital Signs: 





                                        











Temp Pulse Resp BP Pulse Ox


 


 37.4 C   87   20   151/73 H   


 


 21 22:20  21 22:20  21 22:20  21 22:20   











Height: 5 ft 7 in


Weight (kg): 127.913 kg


Body Mass Index: 44.1


BMI Classification: Morbidly Obese





- NPO


>8 hours





- Pregnancy


Is Patient Pregnant?: Yes





- Lab Results


Current Lab Results: 





Laboratory Tests





21 22:40: Blood Type A POSITIVE, Antibody Screen NEGATIVE, Crossmatch IS 

Only See Detail


21 22:40: Sodium 129 L, Potassium 3.9, Chloride 101, Carbon Dioxide 18 L, 

Anion Gap 10.0, BUN 12, Creatinine 0.5, Estimated GFR (MDRD) 147, Glucose 145 H,

Calcium 8.6, Total Bilirubin 0.3, AST 22, ALT 28, Alkaline Phosphatase 194 H, 

Total Protein 6.5 L, Albumin 2.5 L, Globulin 4.0, Albumin/Globulin Ratio 0.6 L


21 22:40: WBC 9.2, RBC 4.01 L, Hgb 10.3 L, Hct 31.8 L, MCV 79.3 L, MCH 

25.7 L, MCHC 32.4, RDW 14.2, Plt Count 267, MPV 11.5 H, Neut # (Auto) 6.4, Lymph

# (Auto) 1.5, Mono # (Auto) 0.9, Eos # (Auto) 0.3, Baso # (Auto) 0.1, Absolute 

Nucleated RBC 0.00, Nucleated RBC % 0.0








Lab results reviewed: Yes


Fish Bones: 


                                 21 22:40





                                 21 22:40





Home Medications and Allergies





Active Medications





Carboprost Tromethamine (Carboprost Tromethamine 250 Mcg/Ml Amp)  250 mcg IM 

ONCE PRN


   PRN Reason: Hemorrhage


   Stop: 21 21:23


Fentanyl (Fentanyl 100 Mcg/2 Ml Vial)  50 mcg IVP Q1H PRN


   PRN Reason: Severe Pain (score 7-10)


Hydralazine HCl (Hydralazine Inj 20 Mg/Ml Vial)  10 mg IVP ONCE PRN; Protocol


   PRN Reason: SBP> or= 160 OR DBP> or= 110


   Stop: 21 21:23


Hydralazine HCl (Hydralazine Inj 20 Mg/Ml Vial)  10 mg IVP .ONCE PRN; Protocol


   PRN Reason: Step 9 of Labetalol protocol


   Stop: 21 21:29


Oxytocin/Sodium Chloride (Pitocin/Sodium Chloride)  500 mls @ 999 mls/hr IV PRN 

PRN; Protocol


   PRN Reason: POST-PARTUM HEMORR PREVENTION


Tranexamic Acid (Tranexamic 1,000 Mg/100ml-Nacl)  1,000 mg in 100 mls @ 600 

mls/hr IV Q30M PRN


   PRN Reason: EBL >1200mL and within 3hr


Lactated Ringer's (Lr)  1,000 mls @ 125 mls/hr IV .Q8H KAT


   Last Admin: 21 00:38 Dose:  125 mls/hr


   Documented by: 


Oxytocin/Sodium Chloride (Pitocin/Sodium Chloride)  500 mls @ 1 mls/hr IV TITR 

KAT; Protocol


   Last Admin: 21 00:45 Dose:  1 milliunit/min, 1 mls/hr


   Documented by: 


Labetalol HCl (Labetalol 20 Mg/4 Ml Syringe)  20 mg IVP ONCE PRN; Protocol


   PRN Reason: SBP> or= 160 OR DBP> or= 110


   Stop: 21 21:23


Labetalol HCl (Labetalol 20 Mg/4 Ml Syringe)  20 - 80 mg IVP Q10M PRN; Protocol


   PRN Reason: SBP> or= 160 OR DBP> or= 110


Labetalol HCl (Labetalol 20 Mg/4 Ml Syringe)  20 mg IVP .ONCE PRN; Protocol


   PRN Reason: Step 9 of nifedipine protocol


   Stop: 21 21:29


Labetalol HCl (Labetalol 100 Mg Tablet)  100 mg PO BID KAT


   Last Admin: 21 01:26 Dose:  100 mg


   Documented by: 


Lidocaine HCl (Lidocaine-Mpf 1% 30 Ml Vial)  30 ml ID ONCE PRN


   PRN Reason: PERINEAL REPAIR


   Stop: 21 21:24


Methylergonovine Maleate (Methylergonovine 0.2 Mg/Ml Vial)  0.2 mg IM ONCE PRN


   PRN Reason: Hemorrhage


   Stop: 21 21:23


Nifedipine (Nifedipine 10 Mg Capsule)  10 - 20 mg PO Q20M PRN; Protocol


   PRN Reason: SBP> or= 160 OR DBP> or= 110


Oxytocin (Oxytocin 10 Unit/Ml Vial)  10 unit IM ONCE PRN


   PRN Reason: Step One if no IV access.


   Stop: 21 21:23


Sodium Chloride (Sodium Chloride Flush 0.9% 10 Ml Syringe)  10 ml IVP PRN PRN


   PRN Reason: AS NEEDED PER PROVIDER ORDERS


Sodium Chloride (Sodium Chloride Flush 0.9% 10 Ml Syringe)  10 ml IVP Q8H KAT


   Last Admin: 21 22:40 Dose:  10 ml


   Documented by: 


Terbutaline Sulfate (Terbutaline 1 Mg/Ml Vial)  0.25 mg SUBQ ONCE PRN


   PRN Reason: Tachystole


   Stop: 21 21:23








Allergies/Adverse Reactions: 


                                    Allergies











Allergy/AdvReac Type Severity Reaction Status Date / Time


 


shellfish derived Allergy Unknown Unknown Verified 21 21:38














Anes History & Medical History





- Anesthetic History


Anesthesia Complications: reports: No previous complications


Family history of Anesthesia Complications: Denies


Family history of Malignant Hyperthermia: Denies





- Medical History


Cardiovascular: reports: None


Pulmonary: reports: None


Gastrointestinal: reports: None


Urinary: reports: None


Neuro: reports: None


Musculoskeletal: reports: None


Endocrine/Autoimmune: reports: None


Blood Disorders: reports: None


Skin: reports: None


Smoking Status: Former smoker


Psychosocial: reports: No issues indicated


History of Cancer?: No





- Obstetrical History


: 1


Parity: 0


Prenatal Events: reports: Pregnancy induced HTN, High risk





Exam


General: Alert, Oriented x3, Cooperative, No acute distress


Dental: WNL





Plan


Anesthesia Type: Epidural


Consent for Procedure(s) Verified and Reviewed: Yes


Code Status: Attempt Resuscitation


ASA classification: 3-Severe systemic disease


Is this case an emergency?: No

## 2021-12-21 NOTE — PROVIDER PROGRESS NOTE
Labor Progress Note





- Uterine Monitoring


Uterine Monitoring Mode: positive: IUPC


Contraction Frequency (min/apart): 2-3


Contraction Intensity: positive: Mild to moderate


Uterine Resting Tone: positive: Soft





- Fetal Monitoring


Fetal Monitor Mode: positive: External ultrasound


Fetal Heart Rate Baseline: 130


Fetal Heart Rate Variability: positive: Moderate (6-25 bmp)


Fetal Accelerations: positive: Present, 15x15


Fetal Decelerations: positive: None


Fetal Strip Review: positive: Category I





- Vaginal Exam


Dilation (in cm): 5


Effacement (%): 90


Station: -2


Cervical Position: Midposition





- Labor Progress Note


Labor Progress Note/Additional Text: 





Patient making minimal change, although quite effaced. IUPC placed at this 

check. Oxytocin at 16 eddi units per minute. Will continue augmentation at this

time.

## 2021-12-22 PROCEDURE — 0HQ9XZZ REPAIR PERINEUM SKIN, EXTERNAL APPROACH: ICD-10-PCS | Performed by: STUDENT IN AN ORGANIZED HEALTH CARE EDUCATION/TRAINING PROGRAM

## 2021-12-22 RX ADMIN — IBUPROFEN SCH MG: 600 TABLET, FILM COATED ORAL at 14:07

## 2021-12-22 RX ADMIN — LABETALOL HCL SCH MG: 100 TABLET, FILM COATED ORAL at 21:37

## 2021-12-22 RX ADMIN — ACETAMINOPHEN SCH MG: 500 TABLET ORAL at 08:35

## 2021-12-22 RX ADMIN — CALCIUM CARBONATE (ANTACID) CHEW TAB 500 MG PRN MG: 500 CHEW TAB at 21:37

## 2021-12-22 RX ADMIN — ACETAMINOPHEN SCH MG: 500 TABLET ORAL at 17:06

## 2021-12-22 RX ADMIN — CALCIUM CARBONATE (ANTACID) CHEW TAB 500 MG PRN MG: 500 CHEW TAB at 03:29

## 2021-12-22 RX ADMIN — IBUPROFEN SCH MG: 600 TABLET, FILM COATED ORAL at 08:33

## 2021-12-22 RX ADMIN — LABETALOL HCL SCH MG: 100 TABLET, FILM COATED ORAL at 08:32

## 2021-12-22 RX ADMIN — ONDANSETRON PRN MG: 2 INJECTION INTRAMUSCULAR; INTRAVENOUS at 05:26

## 2021-12-22 NOTE — DELIVERY NOTE
Delivery Note





- Labor


Labor: positive: Augmented by oxytocin





- Infant Delivery Method


Infant Delivery Method: positive: Spontaneous vaginal delivery





- Birth Presentation


Birth Presentation: positive: JACEK - right occiput anterior





- Nuchal Cord


Nuchal Cord: positive: None





- Anesthetic


Anesthetic Type: 





- Episiotomy Type


Episiotomy Type: positive: None





- Laceration


Laceration: positive: 1st degree





- Suture


Suture Type: positive: Vicryl


Suture Size: positive: 3-0





- Delivery Outcome


Delivery Outcome: positive: Livebirth





- 


: positive: Placed in direct skin contact with mother, Bulb syringe, 

Warmed, [place] used, Warmer used


[place] sex: positive: Male





- Cord


Cord: positive: 3 vessels





- Placenta


Placenta: positive: Intact





- Estimated Blood Loss


Estimated Blood Loss (in cc): 700





- Post Delivery Events


Post Delivery Events: positive: No post delivery events





- Delivery Comments (Free Text/Narrative)


Delivery Comments (Free Text/Narrative): 





Preoperative Diagnoses


37 weeks gestation


Term labor/SROM


Gestational hypertension





Postoperative Diagnoses


Same


Prolonged rupture of membranes


Delivered


Shoulder dystocia


Large for gestational age





Delivery Summary:





Patient presented at 36 weeks 6 days gestation with spontaneous rupture of 

membranes at home.  She was admitted to labor and delivery and after 1 hour 

started on oxytocin.  She had a prolonged rupture of membranes.  She made slow 

progress until active labor.  After this, she progressed normally until complete

and began pushing.  She pushed for approximately 60 minutes when the delivery 

provider was called.  





Patient was placed in the dorsal lithotomy position. Upon maternal pushing the 

head was delivered atraumatically, but a shoulder dystocia was noted which 

resolved with gentle traction while performing Vivian maneuver and suprapubic

pressure.  This lasted for approximately 40 seconds.  The anterior shoulder then

delivered easily followed by the  posterior shoulder, then the remainder of the 

infant's body. A male infant was delivered with APGARS of 6 at 1 minute and 8 at

5 minutes.  After the cord finished pulsating, the umbilical cord was clamped 

times two and cut. The infant was taken to the warmer for further assessment. 





Thirty units of Pitocin were added to the IV fluid and allowed to run freely. 

The placenta delivered intact with three vessel cord, however this took slightly

longer than normal with approximately 40 minutes to deliver the placenta.. 

Placenta was sent to pathology. Uterine massage was performed until uterus was 

deemed firm.





Upon inspection of the perineum, a first-degree laceration was noted which was 

repaired with a stitch of 3-0 Vicryl.  Upon re-inspection the patient was 

hemostatic, however the patient had intermittent atony with large gushes of 

blood during the procedure.  She initially received a dose of 250 mcg of 

Hemabate IM. After the repair was completed, she was noted to still have 

intermittent gushes and received a dose of tranexamic acid. As bleeding 

conitnued, the patient received 800 mcg of misoprostol NV.  Uterus again 

massaged and found to be firm.  Patient was firm at that time, and needle and 

sponge counts were correct.





Patient was stable and allowed to recover in L&D room.  Infant was stable and 

remained in room with mother.





Fetal weight 3994 g. My assessment of the  showed symmetrical use of both

upper extremities. Discussed shoulder dystocia with patient and her partner.

## 2021-12-23 LAB
BASOPHILS NFR BLD AUTO: 0.1 10^3/UL (ref 0–0.1)
BASOPHILS NFR BLD AUTO: 0.5 %
EOSINOPHIL # BLD AUTO: 0.3 10^3/UL (ref 0–0.7)
EOSINOPHIL NFR BLD AUTO: 2.2 %
ERYTHROCYTE [DISTWIDTH] IN BLOOD BY AUTOMATED COUNT: 14.6 % (ref 12–15)
HCT VFR BLD AUTO: 24 % (ref 37–47)
HGB UR QL STRIP: 7.6 G/DL (ref 12–16)
LYMPHOCYTES # SPEC AUTO: 1.9 10^3/UL (ref 1.5–3.5)
LYMPHOCYTES NFR BLD AUTO: 14.7 %
MCH RBC QN AUTO: 25.3 PG (ref 27–31)
MCHC RBC AUTO-ENTMCNC: 31.7 G/DL (ref 32–36)
MCV RBC AUTO: 80 FL (ref 81–99)
MONOCYTES # BLD AUTO: 1.1 10^3/UL (ref 0–1)
MONOCYTES NFR BLD AUTO: 8.8 %
NEUTROPHILS # BLD AUTO: 9.4 10^3/UL (ref 1.5–6.6)
NEUTROPHILS # SNV AUTO: 12.9 X10^3/UL (ref 4.8–10.8)
NEUTROPHILS NFR BLD AUTO: 73.1 %
NRBC # BLD AUTO: 0 /100WBC
NRBC # BLD AUTO: 0 X10^3/UL
PDW BLD AUTO: 10.6 FL (ref 7.9–10.8)
PLATELET # BLD: 230 10^3/UL (ref 130–450)
RBC MAR: 3 10^6/UL (ref 4.2–5.4)

## 2021-12-23 RX ADMIN — ACETAMINOPHEN SCH MG: 500 TABLET ORAL at 18:19

## 2021-12-23 RX ADMIN — IBUPROFEN SCH MG: 600 TABLET, FILM COATED ORAL at 20:40

## 2021-12-23 RX ADMIN — IBUPROFEN SCH MG: 600 TABLET, FILM COATED ORAL at 01:18

## 2021-12-23 RX ADMIN — ACETAMINOPHEN SCH MG: 500 TABLET ORAL at 01:19

## 2021-12-23 RX ADMIN — ACETAMINOPHEN SCH MG: 500 TABLET ORAL at 09:43

## 2021-12-23 RX ADMIN — IBUPROFEN SCH MG: 600 TABLET, FILM COATED ORAL at 14:54

## 2021-12-23 RX ADMIN — IBUPROFEN SCH MG: 600 TABLET, FILM COATED ORAL at 08:16

## 2021-12-23 NOTE — DISCHARGE PLAN
Discharge Plan


Problem Reviewed?: Yes


Disposition: 01 Home, Self Care


Condition: Good


Prescriptions: 


Acetaminophen [Acetaminophen Extra Strength] 1,000 mg PO Q8H PRN #60 tablet


 PRN Reason: Pain


Docusate Sodium 100Mg Capsule [Colace 100Mg Capsule] 100 - 200 mg PO BID PRN #60

cap


 PRN Reason: Constipation


Ibuprofen [Motrin] 600 mg PO Q6H PRN #30 tab


 PRN Reason: Pain


Diet: Regular


Instruction Topics:  Birth Vaginal


No Smoking: If you smoke, Please STOP!  Call [phone] for help.


Follow-up with: 


Dinah Marino MD [Provider Admit Priv/Credential] -

## 2021-12-23 NOTE — DISCHARGE SUMMARY
Discharge Summary


Admit Date: 21


Discharge Date: 21


Discharging Provider: Kwame Hackett MD


Code Status: Attempt Resuscitation


Condition at Discharge: Good


Discharge Disposition: 01 Home, Self Care





- DIAGNOSES


Admission Diagnoses: 


Term labor/spontaneous rupture of membranes


37 weeks gestation








Discharge Diagnoses with Status of Each Condition: 


Term labor/SROM: Delivered


37 weeks gestation: Delivered


Prolonged rupture of membranes: Delivered


Acute blood loss anemia: Stable








- HPI


History of Present Illness: 


Subjective


Patient reports she is doing well.


Lochia appropriate. Denies heavy bleeding.


Ambulating.


Pelvic and abdominal pain well-controlled.


Tolerating oral intake. Diet: Regular.


Voiding without difficulty.


Passing flatus. Denies BM.


Patient is bonding with baby in room


Breast feeding going well.  


Denies feeling lightheaded, dizzy or excessively fatigued.





Objective





General:  Alert, oriented, no apparent distress.


Cardiovascular:   Regular rate.  Regular rhythm.


Lungs: No increased work of breathing.


Abdomen:  Uterus firm. Below umbilicus.  No guarding or rebound.








- HOSPITAL COURSE


Hospital Course: 





Patient was admitted at 37 weeks 6 days 36 weeks 6 days gestation for 

spontaneous rupture membrane, she was started on oxytocin for augmentation.  At 

37 weeks 0 days, she had a spontaneous vaginal delivery complicated by 42nd 

shoulder dystocia.  Her  was large for gestational age.  She did receive 

several medications for postpartum hemorrhage due to uterine atony, and had 

acute blood loss anemia postpartum.  She was vitally stable and asymptomatic, 

and she was discharged on postpartum day 1.





- ALLERGIES


Allergies/Adverse Reactions: 


                                    Allergies











Allergy/AdvReac Type Severity Reaction Status Date / Time


 


shellfish derived Allergy Unknown Unknown Verified 21 21:38














- MEDICATIONS


Home Medications: 


                                Ambulatory Orders











 Medication  Instructions  Recorded  Confirmed


 


Acetaminophen [Acetaminophen Extra 1,000 mg PO Q8H PRN #60 tablet 21 





Strength]   


 


Docusate Sodium 100Mg Capsule 100 - 200 mg PO BID PRN #60 cap 21 





[Colace 100Mg Capsule]   


 


Ibuprofen [Motrin] 600 mg PO Q6H PRN #30 tab 21 














- LABS


Result Diagrams: 


                                 21 06:25





                                 21 22:40





- FOLLOW UP


Follow Up: 


With Dinah Marino in 1 to 2 weeks








- TIME SPENT


Time Spent in Discharge (Minutes): 20

## 2021-12-24 VITALS — DIASTOLIC BLOOD PRESSURE: 76 MMHG | SYSTOLIC BLOOD PRESSURE: 137 MMHG

## 2021-12-24 RX ADMIN — IBUPROFEN SCH MG: 600 TABLET, FILM COATED ORAL at 08:09

## 2021-12-24 RX ADMIN — ACETAMINOPHEN SCH MG: 500 TABLET ORAL at 08:09

## 2021-12-24 NOTE — DISCHARGE SUMMARY
Discharge Summary


Admit Date: 21


Discharge Date: 21


Discharging Provider: Kwame Hackett MD


Code Status: Attempt Resuscitation


Condition at Discharge: Good


Discharge Disposition: 01 Home, Self Care





- DIAGNOSES


Admission Diagnoses: 





Term labor/spontaneous rupture of membranes


37 weeks gestation


Gestational Hypertension








Discharge Diagnoses with Status of Each Condition: 


Term labor/SROM: Delivered


37 weeks gestation: Delivered


Prolonged rupture of membranes: Delivered


Gestational hypertension: Delivered


Acute blood loss anemia: Stable








- HPI


History of Present Illness: 





Subjective


Patient reports she is doing well.


Lochia appropriate. Denies heavy bleeding.


Ambulating.


Pelvic and abdominal pain well-controlled.


Tolerating oral intake. Diet: Regular.


Voiding without difficulty.


Passing flatus.


Patient is bonding with baby in room


Breast feeding going well.  


Denies feeling lightheaded, dizzy or excessively fatigued.





Objective





General:  Alert, oriented, no apparent distress.


Cardiovascular:   Regular rate.  Regular rhythm.


Lungs: No increased work of breathing.


Abdomen:  Uterus firm. Below umbilicus. 





- HOSPITAL COURSE


Hospital Course: 


Patient was admitted at 37 weeks 6 days 36 weeks 6 days gestation for 

spontaneous rupture membrane, she was started on oxytocin for augmentation.  At 

37 weeks 0 days, she had a spontaneous vaginal delivery complicated by 42nd 

shoulder dystocia.  Her  was large for gestational age.  She did receive 

several medications for postpartum hemorrhage due to uterine atony, and had 

acute blood loss anemia postpartum.  Her postpartum course was unremarkable, she

had a couple elevated, but nonsevere blood pressures postpartum and was 

discharged on postpartum day 2.








- ALLERGIES


Allergies/Adverse Reactions: 


                                    Allergies











Allergy/AdvReac Type Severity Reaction Status Date / Time


 


shellfish derived Allergy Unknown Unknown Verified 21 21:38














- MEDICATIONS


Home Medications: 


                                Ambulatory Orders











 Medication  Instructions  Recorded  Confirmed


 


Acetaminophen [Acetaminophen Extra 1,000 mg PO Q8H PRN #60 tablet 21 





Strength]   


 


Docusate Sodium 100Mg Capsule 100 - 200 mg PO BID PRN #60 cap 21 





[Colace 100Mg Capsule]   


 


Ibuprofen [Motrin] 600 mg PO Q6H PRN #30 tab 21 














- LABS


Result Diagrams: 


                                 21 06:25





                                 21 22:40





- FOLLOW UP


Follow Up: 


Follow-up with Dinah Marino in 1 week.





- TIME SPENT


Time Spent in Discharge (Minutes): 15

## 2021-12-24 NOTE — LABOR FLOWSHEET
===================================

Labor Flowsheet

===================================

Datetime Report Generated by CPN: 2021 12:07

   

   

===========================

Datetime: 2021 07:53

===========================

   

   

===================================

VITAL SIGNS

===================================

   

 NBP Sys/Jocelyn/Mean (mmHg):  137

:  76

:  92

 Pulse:  70

   

===========================

Datetime: 2021 20:44

===========================

   

 SpO2 (%):  100

   

===========================

Datetime: 2021 04:57

===========================

   

 Respirations:  20

   

===========================

Datetime: 2021 04:50

===========================

   

Stage of Pregnancy:  Recovery

   

===========================

Datetime: 2021 04:11

===========================

   

 Comments:  Delivery at 0412, 50 sec shoulder distocia, Vivian and super pubic done, Left shoulder
 reduced by Dr. Hackett

   

===========================

Datetime: 2021 04:01

===========================

   

   

===================================

UTERINE ACTIVITY

===================================

   

 Monitor Mode:  Internal

 Frequency (min):  1.5-3

 Duration (sec):  

 Pattern:  Normal: <= 5 Contractions in 10 Minutes

 Resting Tone (Palpate):  Relaxed

 Contraction Comments:  , pushing well, 2nd RN and charge nurse at bedside for delivery

   

===================================

FETAL ASSESSMENT A

===================================

   

 Monitor Mode:  External US

 FHR Baseline Rate :  110

 FHR Baseline Changes:  No Baseline Change

 Variability:  Moderate 6-25 bpm

 Accelerations:  15X15

 Decelerations:  Variable

 Category:  Category II

 Oxygen Method:  Room Air

   

===========================

Datetime: 2021 03:12

===========================

   

   

===================================

MEDICATIONS

===================================

   

 Pitocin (milliunits):  Increased to @ 30

   

===========================

Datetime: 2021 02:41

===========================

   

 Communication Comments:  Update Dr. Hackett, SVE complete +2, Pushing x 1 with progress, Cat 1 stri
p, Instr to cont to push, provider  requests 7 min notificatio. Will cont to monitor and actively pus
h with pt and update as needed

   

===========================

Datetime: 2021 02:36

===========================

   

   

===================================

VAGINAL EXAM

===================================

   

 Dilatation (cm):  10.0

 Effacement (%):  100

 Station:  2

 Exam by:  JUAN Nelson

 Vaginal Bleeding:  Normal Show

   

===========================

Datetime: 2021 02:30

===========================

   

 Resting Tone IUP (mmHg):  20

 Intensity IUP (mmHg):  40-60

 [place] Units (mmHg):  110

 LaborFlag:  Labor

   

===========================

Datetime: 2021 01:58

===========================

   

 Vaginal Exam Comments:   Right anterior lip

 Patient Position/Activity:  Left Extreme

 Patient Care Comments:  Side lying release, peanut ball

   

===========================

Datetime: 2021 00:16

===========================

   

 Temperature (C):  36.7

   

===========================

Datetime: 2021 00:07

===========================

   

 Medication Comments:  new ropivicane bag hung

   

===========================

Datetime: 2021 23:28

===========================

   

   

===================================

PATIENT CARE

===================================

   

 IV/Blood Work:  IV Bolus Started; IV Bolus Given ml @ 300

   

===========================

Datetime: 2021 22:40

===========================

   

 Cervix, Consistency:  Soft

 Cervix, Position:  Midposition

   

===========================

Datetime: 2021 21:48

===========================

   

 Antiemetics/Antacids:  Zofran (mg) @ 4

   

===========================

Datetime: 2021 21:01

===========================

   

 Quality:  Moderate

   

===========================

Datetime: 2021 20:31

===========================

   

 Monitor Interventions for UA:  IUPC Inserted

   

===========================

Datetime: 2021 16:22

===========================

   

   

===================================

COMMUNICATION

===================================

   

 Communication:  Call/Page Placed to Provider

 Provider Notified (Name):  Dr. Hackett

 Notification Reason:  Status Update; Labor Status; Uterine Activity

   

===========================

Datetime: 2021 13:59

===========================

   

 Monitor Interventions for FHR:  Ultrasound Adjusted

   

===========================

Datetime: 2021 12:00

===========================

   

 Pitocin Checklist:  At Least 1 Acceleration of 15 bpm x 15 Seconds in 30 Minutes or Adequate Variabi
lity; No More than 1 Late Deceleration Occurred in Past 30 Minutes; No More than 2 Variable Decelerat
ions > 60 Seconds in Duration and decreasing >60 bpm in 30 minutes; No More than 5 Uterine Contractio
ns in 10 Minutes for any 20 Minute Interval; Uterus Palpates Soft between Contractions

   

===========================

Datetime: 2021 11:39

===========================

   

 Anesthesia Level Check:  T10- Umbilicus

   

===========================

Datetime: 2021 08:10

===========================

   

   

===================================

ANESTHESIA

===================================

   

 Anesthesia Plans:  Epidural

 Epidural Procedure:  Loading Dose; Completed

   

===========================

Datetime: 2021 07:45

===========================

   

 Epidural Positioning:  Sitting

 Anesthesia Comments:  NO administered for pt. anxiety

   

===========================

Datetime: 2021 07:32

===========================

   

   

===================================

PROCEDURE TIME OUT

===================================

   

 Procedure Verify:  Correct Patient Identity; Correct Side and Site are Marked; Accurate Procedure Co
nsent Form; Agreement on Procedure to be Done; Correct Patient Position

   

===========================

Datetime: 2021 06:46

===========================

   

 Pain Assessment Comments:  requesting epidural

   

===========================

Datetime: 2021 06:16

===========================

   

 I/O Interventions:  Up to BR

   

===========================

Datetime: 2021 05:41

===========================

   

   

===================================

PAIN

===================================

   

 Pain Scale:  4

   

===========================

Datetime: 2021 05:36

===========================

   

 Nausea/Vomiting:  Present

   

===========================

Datetime: 2021 05:16

===========================

   

 Analgesics/Sedatives:  Fentanyl (mcg) @ 50

   

===========================

Datetime: 2021 05:00

===========================

   

 Temperature Route:  Oral

   

===========================

Datetime: 2021 03:19

===========================

   

 Vital Sign Comments:  attempted to take BP on Right lower leg received irred result retake on r arm

   

===========================

Datetime: 2021 02:47

===========================

   

 Membrane Status:  Ruptured

 Membranes Rupture Method:  Spontaneous

 Amniotic Fluid Color:  Clear

 Amniotic Fluid Amount:  Moderate

 Amniotic Fluid Odor:  Normal

 Pool:  Positive

   

===================================

MATERNAL ASSESSMENT

===================================

   

 Level of Consciousness:  Alert

 DTR's/Clonus:  DTRs 2+

 Headache:  Denies

 Breath Sounds, Left:  Clear and Equal

 Breath Sounds, Right:  Clear and Equal

 RUQ Epigastric Pain:  Denies

   

===========================

Datetime: 2021 22:10

===========================

   

 Membranes Ruptured Date/Time:  2021 20:00

   

===========================

Datetime: 2021 21:45

===========================

   

 Pain Presence:  None/Denies

## 2021-12-28 NOTE — ANESTHESIA POST OP EVALUATION
Anesthesia Post Eval





- Post Anesthesia Eval


Vitals: 





                                Last Vital Signs











Temp  36.8 C   12/24/21 08:00


 


Pulse  69   12/24/21 08:00


 


Resp  20   12/24/21 08:00


 


BP  137/76 H  12/24/21 08:00


 


Pulse Ox  100   12/24/21 08:00











CV Function Including HR & BP: Stable


Pain Control: Satisfactory


Nausea & Vomiting: Negative


Mental Status: Baseline


Respiratory Status: Airway Patent


Hydration Status: Satisfactory


Anesthesia Complications: None

## 2021-12-29 ENCOUNTER — HOSPITAL ENCOUNTER (EMERGENCY)
Dept: HOSPITAL 76 - ED | Age: 26
Discharge: HOME | End: 2021-12-29
Payer: COMMERCIAL

## 2021-12-29 VITALS — DIASTOLIC BLOOD PRESSURE: 88 MMHG | SYSTOLIC BLOOD PRESSURE: 148 MMHG

## 2021-12-29 DIAGNOSIS — Z20.822: ICD-10-CM

## 2021-12-29 DIAGNOSIS — J90: ICD-10-CM

## 2021-12-29 DIAGNOSIS — Z87.891: ICD-10-CM

## 2021-12-29 LAB
ALBUMIN DIAFP-MCNC: 2.7 G/DL (ref 3.2–5.5)
ALBUMIN/GLOB SERPL: 0.7 {RATIO} (ref 1–2.2)
ALP SERPL-CCNC: 149 IU/L (ref 42–121)
ALT SERPL W P-5'-P-CCNC: 25 IU/L (ref 10–60)
ANION GAP SERPL CALCULATED.4IONS-SCNC: 9 MMOL/L (ref 6–13)
AST SERPL W P-5'-P-CCNC: 15 IU/L (ref 10–42)
B PARAPERT DNA SPEC QL NAA+PROBE: NOT DETECTED
B PERT DNA SPEC QL NAA+PROBE: NOT DETECTED
BASOPHILS NFR BLD AUTO: 0.1 10^3/UL (ref 0–0.1)
BASOPHILS NFR BLD AUTO: 0.9 %
BILIRUB BLD-MCNC: 0.4 MG/DL (ref 0.2–1)
BUN SERPL-MCNC: 11 MG/DL (ref 6–20)
C PNEUM DNA NPH QL NAA+NON-PROBE: NOT DETECTED
CALCIUM UR-MCNC: 8.4 MG/DL (ref 8.5–10.3)
CHLORIDE SERPL-SCNC: 107 MMOL/L (ref 101–111)
CO2 SERPL-SCNC: 22 MMOL/L (ref 21–32)
CREAT SERPLBLD-SCNC: 0.6 MG/DL (ref 0.4–1)
EOSINOPHIL # BLD AUTO: 0.3 10^3/UL (ref 0–0.7)
EOSINOPHIL NFR BLD AUTO: 3.1 %
ERYTHROCYTE [DISTWIDTH] IN BLOOD BY AUTOMATED COUNT: 14.6 % (ref 12–15)
FLUAV RNA RESP QL NAA+PROBE: NOT DETECTED
GFRSERPLBLD MDRD-ARVRAT: 119 ML/MIN/{1.73_M2} (ref 89–?)
GLOBULIN SER-MCNC: 3.9 G/DL (ref 2.1–4.2)
GLUCOSE SERPL-MCNC: 93 MG/DL (ref 70–100)
HAEM INFLU B DNA SPEC QL NAA+PROBE: NOT DETECTED
HCOV 229E RNA SPEC QL NAA+PROBE: NOT DETECTED
HCOV HKU1 RNA UPPER RESP QL NAA+PROBE: NOT DETECTED
HCOV NL63 RNA ASPIRATE QL NAA+PROBE: NOT DETECTED
HCOV OC43 RNA SPEC QL NAA+PROBE: NOT DETECTED
HCT VFR BLD AUTO: 28.2 % (ref 37–47)
HGB UR QL STRIP: 8.8 G/DL (ref 12–16)
HMPV AG SPEC QL: NOT DETECTED
HPIV1 RNA NPH QL NAA+PROBE: NOT DETECTED
HPIV2 SPEC QL CULT: NOT DETECTED
HPIV3 AB TITR SER CF: NOT DETECTED {TITER}
HPIV4 RNA SPEC QL NAA+PROBE: NOT DETECTED
LYMPHOCYTES # SPEC AUTO: 1.5 10^3/UL (ref 1.5–3.5)
LYMPHOCYTES NFR BLD AUTO: 16.4 %
M PNEUMO DNA SPEC QL NAA+PROBE: NOT DETECTED
MCH RBC QN AUTO: 25.1 PG (ref 27–31)
MCHC RBC AUTO-ENTMCNC: 31.2 G/DL (ref 32–36)
MCV RBC AUTO: 80.3 FL (ref 81–99)
MONOCYTES # BLD AUTO: 0.7 10^3/UL (ref 0–1)
MONOCYTES NFR BLD AUTO: 7.3 %
NEUTROPHILS # BLD AUTO: 6.6 10^3/UL (ref 1.5–6.6)
NEUTROPHILS # SNV AUTO: 9.4 X10^3/UL (ref 4.8–10.8)
NEUTROPHILS NFR BLD AUTO: 70.9 %
NRBC # BLD AUTO: 0 /100WBC
NRBC # BLD AUTO: 0 X10^3/UL
PDW BLD AUTO: 10 FL (ref 7.9–10.8)
PLATELET # BLD: 415 10^3/UL (ref 130–450)
POTASSIUM SERPL-SCNC: 4.1 MMOL/L (ref 3.5–5)
PROT SPEC-MCNC: 6.6 G/DL (ref 6.7–8.2)
RBC MAR: 3.51 10^6/UL (ref 4.2–5.4)
RSV RNA RESP QL NAA+PROBE: NOT DETECTED
RV+EV RNA SPEC QL NAA+PROBE: NOT DETECTED
SARS-COV-2 RNA PNL SPEC NAA+PROBE: NOT DETECTED
SODIUM SERPLBLD-SCNC: 138 MMOL/L (ref 135–145)

## 2021-12-29 PROCEDURE — 0202U NFCT DS 22 TRGT SARS-COV-2: CPT

## 2021-12-29 PROCEDURE — 36415 COLL VENOUS BLD VENIPUNCTURE: CPT

## 2021-12-29 PROCEDURE — 93005 ELECTROCARDIOGRAM TRACING: CPT

## 2021-12-29 PROCEDURE — 85025 COMPLETE CBC W/AUTO DIFF WBC: CPT

## 2021-12-29 PROCEDURE — 80053 COMPREHEN METABOLIC PANEL: CPT

## 2021-12-29 PROCEDURE — 99284 EMERGENCY DEPT VISIT MOD MDM: CPT

## 2021-12-29 PROCEDURE — 71275 CT ANGIOGRAPHY CHEST: CPT

## 2021-12-29 PROCEDURE — 83880 ASSAY OF NATRIURETIC PEPTIDE: CPT

## 2021-12-29 NOTE — CT REPORT
PROCEDURE:  ANGIO CHEST W/WO

 

INDICATIONS:  dyspnea, 1 week post partum

 

CONTRAST:  IV CONTRAST: Optiray 320 ml: 80 PO CONTRAST: *NO PO CONTRAST 

 

TECHNIQUE:  

After the administration of intravenous contrast, 2 mm axial images were acquired from the pulmonary 
apices to the posterior costophrenic angles during the arterial phase. In addition, 1 mm lung kernel 
and 5 mm soft tissue kernel reconstructions were performed. 3-dimensional coronal oblique maximum int
ensity projection (MIP) reformats, 8 mm axial MIP, and 5 mm coronal and sagittal MPR reformats were t
hen performed through the thorax. For radiation dose reduction, the following was used: automated exp
osure control, adjustment of mA and/or kV according to patient size.

 

COMPARISON:  None.

 

FINDINGS:  

Image quality: There is streak artifact and mild motion artifact limiting evaluation.  

 

Pulmonary arteries:  Pulmonary arteries are normal in size, and demonstrate no intraluminal filling d
efects to suggest central pulmonary embolism. Evaluation of distal subsegmental branches is slightly 
limited by motion artifact. 

 

Lungs and pleura: There are small to moderate-sized bilateral pleural effusions with associated compr
essive atelectasis. Indistinct groundglass opacities also demonstrated bilaterally with mild septal t
hickening consistent with pulmonary edema. There are small clustered groundglass nodules also demonst
rated with a basilar and right-sided predominance. The trachea and central airways are patent. No pne
umothorax. 

 

Mediastinum:  Heart size is normal, without pericardial effusion.  No mediastinal or hilar adenopathy
.  Thoracic aorta is normal in caliber and enhancement.  Esophagus is normal in caliber, without hiat
al hernia.  

 

Bones and chest wall:  No suspicious bony lesions.  Ribs and thoracic spine appear intact throughout.
  No axillary or supraclavicular adenopathy. The visualized thyroid demonstrates no discrete nodules.
 Abdomen:  Visualized upper abdominal solid organs appear normal in the early arterial phase of enhan
cement.  

 

IMPRESSION:  

 

1. No evidence of central pulmonary embolism. Evaluation of distal subsegmental branches slightly zuluaga
ited by motion artifact.

 

2. Small to moderate bilateral pleural effusions.

 

3. Clustered small groundglass nodules with a basilar and right-sided predominance suggestive of an a
typical pneumonia.

 

4. Indistinct groundglass opacities and mild septal thickening consistent with pulmonary edema.

 

Reviewed by: Triston Thomas MD on 12/29/2021 1:37 PM PST

Approved by: Triston Thomas MD on 12/29/2021 1:37 PM PST

 

 

Station ID:  [id number]

## 2021-12-29 NOTE — ED PHYSICIAN DOCUMENTATION
History of Present Illness





- Stated complaint


Stated Complaint: SOA





- Chief complaint


Chief Complaint: Resp





- History obtained from


History obtained from: Patient





- History of Present Illness


Timing: How many weeks ago (1)


Pain level max: 0


Pain level now: 0





- Additonal information


Additional information: 





26-year-old female,  1 para 1 who presents to the emergency department 

stating that she gave birth about 1 week ago.  She states that delivery was 

complicated by a postpartum hemorrhage.  She did not require blood transfusion 

but her hemoglobin did drop down to 7.  She states that since she has gotten 

home she has noticed that she is short of breath when walking upstairs or lying 

down.  Has had a mild cough.  No congestion.  No fever.  Better with rest, worse

with exertion.  She states her vaginal discharge and bleeding have decreased.  

She is currently breast-feeding.  She states she also has pregnancy-induced 

hypertension, normal blood pressures in the 140s systolic.  She states she also 

has a history of anxiety and is currently on her anxiety medication.





Review of Systems


Constitutional: denies: Fever, Chills


Respiratory: denies: Cough


GI: denies: Abdominal Pain, Nausea, Vomiting, Diarrhea


: denies: Dysuria, Frequency, Hesitancy, Now pregnant EGA


Skin: denies: Rash


Musculoskeletal: denies: Neck pain, Back pain


Neurologic: denies: Focal weakness, Headache, Head injury





PD PAST MEDICAL HISTORY





- Past Medical History


Cardiovascular: None


Respiratory: None


Neuro: None


Endocrine/Autoimmune: None


GI: None


: None


Musculoskeletal: None


Derm: None





- Present Medications


Home Medications: 


                                Ambulatory Orders











 Medication  Instructions  Recorded  Confirmed


 


Acetaminophen [Acetaminophen Extra 1,000 mg PO Q8H PRN #60 tablet 21 





Strength]   


 


Docusate Sodium 100Mg Capsule 100 - 200 mg PO BID PRN #60 cap 21 





[Colace 100Mg Capsule]   


 


Ibuprofen [Motrin] 600 mg PO Q6H PRN #30 tab 21 


 


Furosemide [Lasix] 20 mg PO DAILY #5 tablet 21 














- Allergies


Allergies/Adverse Reactions: 


                                    Allergies











Allergy/AdvReac Type Severity Reaction Status Date / Time


 


shellfish derived Allergy Unknown Unknown Verified 21 12:23














- Social History


Smoking Status: Former smoker





PD ED PE NORMAL





- Vitals


Vital signs reviewed: Yes





- General


General: Alert and oriented X 3, No acute distress





- HEENT


HEENT: PERRL, Moist mucous membranes





- Neck


Neck: Supple, no meningeal sign





- Cardiac


Cardiac: RRR, Strong equal pulses





- Respiratory


Respiratory: No respiratory distress, Clear bilaterally





- Abdomen


Abdomen: Soft, Non tender, Non distended





- Derm


Derm: Warm and dry





- Extremities


Extremities: No edema, No calf tenderness / cord





- Neuro


Neuro: Alert and oriented X 3





- Psych


Psych: Normal mood, Normal affect





Results





- Vitals


Vitals: 


                               Vital Signs - 24 hr











  21





  12:20 12:52 13:46


 


Temperature 97.6 C H  


 


Heart Rate 63 62 54 L


 


Respiratory 16 16 17





Rate   


 


Blood Pressure 181/99 H 146/98 H 152/100 H


 


O2 Saturation 97 96 98














  21





  14:21


 


Temperature 


 


Heart Rate 78


 


Respiratory 20





Rate 


 


Blood Pressure 148/88 H


 


O2 Saturation 98








                                     Oxygen











O2 Source                      Room air

















- EKG (time done)


  ** 1247


Rate: Rate (enter#) (58)


Rhythm: NSR


Axis: Normal


Intervals: Normal [place]


QRS: Normal


Ischemia: Normal ST segments


Computer interpretation: Agree with computer





- Labs


Labs: 


                                Laboratory Tests











  21





  12:33 12:33 12:33


 


WBC  9.4  


 


RBC  3.51 L  


 


Hgb  8.8 L  


 


Hct  28.2 L  


 


MCV  80.3 L  


 


MCH  25.1 L  


 


MCHC  31.2 L  


 


RDW  14.6  


 


Plt Count  415  


 


MPV  10.0  


 


Neut # (Auto)  6.6  


 


Lymph # (Auto)  1.5  


 


Mono # (Auto)  0.7  


 


Eos # (Auto)  0.3  


 


Baso # (Auto)  0.1  


 


Absolute Nucleated RBC  0.00  


 


Nucleated RBC %  0.0  


 


Sodium   138 


 


Potassium   4.1 


 


Chloride   107 


 


Carbon Dioxide   22 


 


Anion Gap   9.0 


 


BUN   11 


 


Creatinine   0.6 


 


Estimated GFR (MDRD)   119 


 


Glucose   93 


 


Calcium   8.4 L 


 


Total Bilirubin   0.4 


 


AST   15 


 


ALT   25 


 


Alkaline Phosphatase   149 H 


 


B-Natriuretic Peptide    235 H


 


Total Protein   6.6 L 


 


Albumin   2.7 L 


 


Globulin   3.9 


 


Albumin/Globulin Ratio   0.7 L 


 


Nasal Adenovirus (PCR)   


 


Nasal B. parapertussis DNA (PCR)   


 


Nasal Coronavir 229E PCR   


 


Nasal Coronavir HKU1 PCR   


 


Nasal Coronavir NL63 PCR   


 


Nasal Coronavir OC43 PCR   


 


Nasal Enterovir/Rhinovir PCR   


 


Nasal Influenza B PCR   


 


Nasal Influenza A PCR   


 


Nasal Parainfluen 1 PCR   


 


Nasal Parainfluen 2 PCR   


 


Nasal Parainfluen 3 PCR   


 


Nasal Parainfluen 4 PCR   


 


Nasal RSV (PCR)   


 


Nasal B.pertussis DNA PCR   


 


Nasal C.pneumoniae (PCR)   


 


Juaquin Human Metapneumo PCR   


 


Nasal M.pneumoniae (PCR)   


 


Nasal SARS-CoV-2 (PCR)   














  21





  14:05


 


WBC 


 


RBC 


 


Hgb 


 


Hct 


 


MCV 


 


MCH 


 


MCHC 


 


RDW 


 


Plt Count 


 


MPV 


 


Neut # (Auto) 


 


Lymph # (Auto) 


 


Mono # (Auto) 


 


Eos # (Auto) 


 


Baso # (Auto) 


 


Absolute Nucleated RBC 


 


Nucleated RBC % 


 


Sodium 


 


Potassium 


 


Chloride 


 


Carbon Dioxide 


 


Anion Gap 


 


BUN 


 


Creatinine 


 


Estimated GFR (MDRD) 


 


Glucose 


 


Calcium 


 


Total Bilirubin 


 


AST 


 


ALT 


 


Alkaline Phosphatase 


 


B-Natriuretic Peptide 


 


Total Protein 


 


Albumin 


 


Globulin 


 


Albumin/Globulin Ratio 


 


Nasal Adenovirus (PCR)  NOT DETECTED


 


Nasal B. parapertussis DNA (PCR)  NOT DETECTED


 


Nasal Coronavir 229E PCR  NOT DETECTED


 


Nasal Coronavir HKU1 PCR  NOT DETECTED


 


Nasal Coronavir NL63 PCR  NOT DETECTED


 


Nasal Coronavir OC43 PCR  NOT DETECTED


 


Nasal Enterovir/Rhinovir PCR  NOT DETECTED


 


Nasal Influenza B PCR  NOT DETECTED


 


Nasal Influenza A PCR  NOT DETECTED


 


Nasal Parainfluen 1 PCR  NOT DETECTED


 


Nasal Parainfluen 2 PCR  NOT DETECTED


 


Nasal Parainfluen 3 PCR  NOT DETECTED


 


Nasal Parainfluen 4 PCR  NOT DETECTED


 


Nasal RSV (PCR)  NOT DETECTED


 


Nasal B.pertussis DNA PCR  NOT DETECTED


 


Nasal C.pneumoniae (PCR)  NOT DETECTED


 


Juaquin Human Metapneumo PCR  NOT DETECTED


 


Nasal M.pneumoniae (PCR)  NOT DETECTED


 


Nasal SARS-CoV-2 (PCR)  NOT DETECTED














- Rads (name of study)


  ** CT angio chest


Radiology: Final report received, EMP read contemporaneously, See rad report





PD MEDICAL DECISION MAKING





- ED course


Complexity details: reviewed results, re-evaluated patient, considered 

differential, d/w patient


ED course: 





Initial blood pressure was 181/99, a large cuff was placed on the patient and 

and her blood pressure was rechecked and found to be 148/96.





No evidence of PE on CT angio of the chest.  She does have a small to moderate 

bilateral pleural effusions.  Negative Covid swab.  Discussed the case with OB 

on-call, Dr. Marino.  We will start the patient on Lasix and have her follow-

up in the office.  Patient is very well-appearing, nontoxic.  Afebrile.  No 

hypoxia or respiratory distress.  Patient counseled regarding signs and symptoms

 for which I believe and urgent re-evaluation would be necessary. Patient with 

good understanding of and agreement to plan and is comfortable going home at 

this time





This document was made in part using voice recognition software. While efforts 

are made to proofread this document, sound alike and grammatical errors may 

occur.














IMPRESSION: 





1. No evidence of central pulmonary embolism. Evaluation of distal subsegmental 

branches slightly 


limited by motion artifact. 





2. Small to moderate bilateral pleural effusions. 





3. Clustered small groundglass nodules with a basilar and right-sided 

predominance suggestive of an


atypical pneumonia. 





4. Indistinct groundglass opacities and mild septal thickening consistent with 

pulmonary edema. 








Departure





- Departure


Disposition:  Home, Self Care


Clinical Impression: 


 Pleural effusion





Anemia


Qualifiers:


 Anemia type: unspecified type Qualified Code(s): D64.9 - Anemia, unspecified





Dyspnea


Qualifiers:


 Dyspnea type: unspecified Qualified Code(s): R06.00 - Dyspnea, unspecified





Condition: Good


Instructions:  ED Dyspnea Shortness of Breath, ED Effusion Pleural


Follow-Up: 


Dinah Marino MD [Provider Admit Priv/Credential] - Within 1 week


Prescriptions: 


Furosemide [Lasix] 20 mg PO DAILY #5 tablet


Comments: 


Your prescription was sent to [facility] in [place].  I discussed her case 

with Dr. Marino today.  She will follow up with you next week.  Please return 

sooner if you worsen.  The Covid tests is pending and I will call you if it is 

positive.  I think that your symptoms are likely due to your anemia as well as 

the pleural effusions.  This should continue to improve.  Please continue to 

walk at home as well.


Discharge Date/Time: 21 14:25